# Patient Record
Sex: FEMALE | Race: WHITE
[De-identification: names, ages, dates, MRNs, and addresses within clinical notes are randomized per-mention and may not be internally consistent; named-entity substitution may affect disease eponyms.]

---

## 2018-09-25 ENCOUNTER — HOSPITAL ENCOUNTER (EMERGENCY)
Dept: HOSPITAL 80 - CED | Age: 28
Discharge: HOME | End: 2018-09-25
Payer: MEDICAID

## 2018-09-25 VITALS — SYSTOLIC BLOOD PRESSURE: 105 MMHG | DIASTOLIC BLOOD PRESSURE: 63 MMHG

## 2018-09-25 DIAGNOSIS — J02.9: Primary | ICD-10-CM

## 2018-09-25 DIAGNOSIS — J06.9: ICD-10-CM

## 2018-09-25 DIAGNOSIS — F17.210: ICD-10-CM

## 2018-09-25 NOTE — EDPHY
H & P


Stated Complaint: SWOLLEN THROAT SINCE THIS MORNING. HARD TO SWALLOW


Time Seen by Provider: 09/25/18 21:54


HPI/ROS: 





CHIEF COMPLAINT:  Sore throat





HISTORY OF PRESENT ILLNESS:  This is a 28-year-old female who presents with a 

sore throat that began this morning.  Is been difficult for her to eat and 

drink throughout the day because of pain.  She has not had fever.  She has had 

recent symptoms of an upper respiratory infection with cough and rhinorrhea.  

She has had some intermittent right ear pain, none now.  No vomiting, diarrhea, 

or abdominal pain.  She does not feel short of breath.  She is not experiencing 

chest pain.





REVIEW OF SYSTEMS:


A ten system review of systems was performed and is negative with the exception 

of the items mentioned in the HPI.





Past medical history:





Past surgical history:  Negative





Family history:  Negative





Social history:  Lives with her boyfriend and 2 children.  She is not employed 

outside the home.  Smokes 1 pack of cigarettes daily.





General Appearance:  Alert.  Vital signs reviewed.  Afebrile.  Occasional cough.


Eyes:  Pupils equal and round, no conjunctival injection, no discharge. 

Anicteric.


ENT, Mouth:  Mucous membranes are moist, oropharyngeal erythema without edema 

or exudate.  Right tympanic membrane obscured by cerumen, left tympanic 

membrane normal.


Neck:  Mild anterior cervical lymphadenopathy, supple.


Respiratory:  Lungs are clear to auscultation; no wheezes, rales, or rhonchi.


Cardiovascular:  Regular rate and rhythm; no murmur, rub, or gallop.


Gastrointestinal:  Abdomen is soft and nontender, no masses or organomegaly, 

bowel sounds normal.


Skin:  Warm and dry, no rashes on exposed skin, normal color.


Back:  Nontender to palpation over the thoracolumbar spine. No CVAT.


Extremities:  No lower extremity edema, no calf tenderness or swelling.


Neurological:  Alert and oriented.  Moving all four extremities easily and 

equally.


Psychiatric:  Normal affect.





- Personal History


Current Tetanus Diphtheria and Acellular Pertussis (TDAP): No





- Medical/Surgical History


Hx Asthma: No


Hx Chronic Respiratory Disease: No


Hx Diabetes: No


Hx Cardiac Disease: No


Hx Renal Disease: No


Hx Cirrhosis: No


Hx Alcoholism: No


Hx HIV/AIDS: No


Hx Splenectomy or Spleen Trauma: No


Other PMH: DENIES





- Social History


Smoking Status: Current every day smoker


Constitutional: 


 Initial Vital Signs











Temperature (C)  36.8 C   09/25/18 21:37


 


Heart Rate  97   09/25/18 21:37


 


Respiratory Rate  16   09/25/18 21:37


 


Blood Pressure  105/63   09/25/18 21:37


 


O2 Sat (%)  94   09/25/18 21:37








 











O2 Delivery Mode               Room Air














Allergies/Adverse Reactions: 


 





No Known Allergies Allergy (Verified 09/25/18 22:08)


 








Home Medications: 














 Medication  Instructions  Recorded


 


NK [No Known Home Meds]  09/25/18














Medical Decision Making


ED Course/Re-evaluation: 





Sore throat with cough and other signs/symptoms of upper respiratory infection.

  I think that this is a viral illness.  I do not suspect strep pharyngitis.  

She is not febrile and I doubt that this is influenza, although the season is 

approaching.  She is not toxic appearing and is not hypoxic.  Her lungs are 

clear and I do not think that she has a pneumonia or bronchitis.  I am 

recommending symptomatic treatment.





Departure





- Departure


Disposition: Home, Routine, Self-Care


Clinical Impression: 


 Acute pharyngitis, Acute upper respiratory infection





Condition: Good


Instructions:  Pharyngitis (ED), Upper Respiratory Infection (ED)


Additional Instructions: 


Try to stay hydrated--drink plenty of fluids even if it is painful to do so.





Adult Pain & Fever Control:


We recommend Acetaminophen (Tylenol) and Ibuprofen (Motrin,Advil) for pain and 

fever control.  When fever is high or pain severe, both drugs can be used at 

the same time, but at different intervals.  Please note the time differences.  


Your dose is:     Acetaminophen 650mg every 4 to 6 hours


        Ibuprofen 400mg every 6 hours with food   OR


        .


Note:  do not take Acetaminophen with Hydrocodone (Vicodin, Lortab) or Oycodone 

(Percocet).  These medications also contain Acetaminophen.  No more than 3000mg 

of Acetaminophen should be taken in 24 hours (for an adult).





You received a dose of ibuprofen 600 mg at around 10:00 p.m. In the emergency 

department.  You can take another dose at 4:00 a.m., 6 hr after he received it 

in the emergency department.





Take Tylenol when you get home tonight.  It is okay to take the Tylenol PM that 

you have.  It is also okay to take 1 of the Vicodin when you get home.  

Remember that these have Tylenol in them--325 mg in each pill.





Tomorrow morning you should buy some plain Tylenol (acetaminophen--any brand) 

and some ibuprofen (Motrin or any brand is fine).  Alternate between these 2 

medications.





Try some Gypsy Throat Coat tea.  Add honey.





If you are not getting better schedule an appointment at [facility].  If you are 

worse in any way--can swallow, trouble breathing, any new or concerning symptoms

--you can return to the emergency department for a re-evaluation.